# Patient Record
Sex: MALE | Employment: FULL TIME | URBAN - METROPOLITAN AREA
[De-identification: names, ages, dates, MRNs, and addresses within clinical notes are randomized per-mention and may not be internally consistent; named-entity substitution may affect disease eponyms.]

---

## 2018-09-24 ENCOUNTER — HOSPITAL ENCOUNTER (OUTPATIENT)
Age: 28
Discharge: HOME | End: 2018-09-24
Attending: FAMILY MEDICINE
Payer: COMMERCIAL

## 2018-09-24 VITALS
TEMPERATURE: 98 F | SYSTOLIC BLOOD PRESSURE: 126 MMHG | OXYGEN SATURATION: 100 % | RESPIRATION RATE: 18 BRPM | DIASTOLIC BLOOD PRESSURE: 75 MMHG | HEART RATE: 70 BPM

## 2018-09-24 DIAGNOSIS — S61.012A LACERATION OF LEFT THUMB WITHOUT FOREIGN BODY WITHOUT DAMAGE TO NAIL, INITIAL ENCOUNTER: Primary | ICD-10-CM

## 2018-09-24 PROCEDURE — 12001 RPR S/N/AX/GEN/TRNK 2.5CM/<: CPT

## 2018-09-24 PROCEDURE — 99203 OFFICE O/P NEW LOW 30 MIN: CPT

## 2018-09-24 PROCEDURE — 99202 OFFICE O/P NEW SF 15 MIN: CPT

## 2018-09-24 NOTE — ED INITIAL ASSESSMENT (HPI)
Pt was taking a shower this am about 40 minutes ago, and the glass door shattered as he was holding the handle opening the door. He has a 1 cm jagged cut to the left hand.   He believes he has had a Tetanus in the past year, and will check his records to v

## 2018-09-24 NOTE — ED PROVIDER NOTES
Patient Seen in: [facility]    History   Patient presents with:  Laceration Abrasion (integumentary)    Stated Complaint: laceration x today     HPI    Right-hand-dominant male with a laceration to the left thumb this morning. Yes  TendonFunctionIntact: Yes  DressingType: Band-Aid  Td: Presumed UTD  PT Tolerated: Without complaint      MDM   Visual laceration of the left thumb. Suture, glue, and Steri-Strips were discussed. Patient opted for clue. Tetanus is presumed today.

## 2018-09-26 PROBLEM — G56.22 LESION OF LEFT ULNAR NERVE: Status: ACTIVE | Noted: 2018-09-26

## 2019-04-29 ENCOUNTER — HOSPITAL ENCOUNTER (OUTPATIENT)
Age: 29
Discharge: HOME | End: 2019-04-29
Attending: FAMILY MEDICINE
Payer: COMMERCIAL

## 2019-04-29 VITALS
DIASTOLIC BLOOD PRESSURE: 84 MMHG | HEART RATE: 65 BPM | OXYGEN SATURATION: 99 % | SYSTOLIC BLOOD PRESSURE: 137 MMHG | RESPIRATION RATE: 16 BRPM | TEMPERATURE: 98 F

## 2019-04-29 DIAGNOSIS — H61.23 BILATERAL IMPACTED CERUMEN: Primary | ICD-10-CM

## 2019-04-29 DIAGNOSIS — H60.503 ACUTE OTITIS EXTERNA OF BOTH EARS, UNSPECIFIED TYPE: ICD-10-CM

## 2019-04-29 PROCEDURE — 99214 OFFICE O/P EST MOD 30 MIN: CPT

## 2019-04-29 PROCEDURE — 69209 REMOVE IMPACTED EAR WAX UNI: CPT

## 2019-04-29 PROCEDURE — 99213 OFFICE O/P EST LOW 20 MIN: CPT

## 2019-04-29 RX ORDER — NEOMYCIN SULFATE, POLYMYXIN B SULFATE AND HYDROCORTISONE 10; 3.5; 1 MG/ML; MG/ML; [USP'U]/ML
4 SUSPENSION/ DROPS AURICULAR (OTIC) 3 TIMES DAILY
Qty: 10 ML | Refills: 0 | Status: SHIPPED | OUTPATIENT
Start: 2019-04-29 | End: 2019-05-02

## 2019-04-30 NOTE — ED INITIAL ASSESSMENT (HPI)
Complains of hearing difficulty on the left ear for the last three days. States had placed an OTC medication for ear wax.

## 2019-04-30 NOTE — ED PROVIDER NOTES
Patient Seen in: [facility]    History   Patient presents with:  Ear Problem Pain (neurosensory)    Stated Complaint: ear clogged     HPI    This 24-year-old male presents to the office with complaint of decreased hearing out of was placed in both ears and then bilateral ears were irrigated by nursing staff. Repeat ear exam after the ear flush shows bilateral TMs to be normal.  Bilateral EACs are mildly red and irritated. No drainage is noted.      Symptomatic treatment for earwa

## 2019-11-27 ENCOUNTER — OFFICE VISIT (OUTPATIENT)
Dept: FAMILY MEDICINE CLINIC | Facility: CLINIC | Age: 29
End: 2019-11-27
Payer: COMMERCIAL

## 2019-11-27 VITALS
RESPIRATION RATE: 20 BRPM | WEIGHT: 196 LBS | HEART RATE: 68 BPM | OXYGEN SATURATION: 98 % | BODY MASS INDEX: 29.7 KG/M2 | SYSTOLIC BLOOD PRESSURE: 118 MMHG | DIASTOLIC BLOOD PRESSURE: 82 MMHG | TEMPERATURE: 98 F | HEIGHT: 68 IN

## 2019-11-27 DIAGNOSIS — J01.00 ACUTE MAXILLARY SINUSITIS, RECURRENCE NOT SPECIFIED: Primary | ICD-10-CM

## 2019-11-27 DIAGNOSIS — J01.10 ACUTE FRONTAL SINUSITIS, RECURRENCE NOT SPECIFIED: ICD-10-CM

## 2019-11-27 PROCEDURE — 99203 OFFICE O/P NEW LOW 30 MIN: CPT | Performed by: NURSE PRACTITIONER

## 2019-11-27 RX ORDER — AMOXICILLIN 875 MG/1
875 TABLET, COATED ORAL 2 TIMES DAILY
Qty: 20 TABLET | Refills: 0 | Status: SHIPPED | OUTPATIENT
Start: 2019-11-27 | End: 2019-12-07

## 2019-11-27 RX ORDER — FLUTICASONE PROPIONATE 50 MCG
1 SPRAY, SUSPENSION (ML) NASAL 2 TIMES DAILY
Qty: 1 BOTTLE | Refills: 1 | Status: SHIPPED | OUTPATIENT
Start: 2019-11-27 | End: 2019-12-27

## 2019-11-27 NOTE — PATIENT INSTRUCTIONS
·  PLAN: Amoxicillin, take as directed. Finish all the medication even if you feel better. · Probiotics or yogurt daily during antibiotic use will help decrease stomach upset and restore good bacteria to the gut.   Please start Flonase 1 spray each nostri

## 2019-11-27 NOTE — PROGRESS NOTES
HPI:   Lexus Nation is a 34year old male who presents with ill symptoms for  1  months. Patient reports persistent congestion, tactile fever, sinus pressure. Has tried Mucinex, Motrin and occasional sinus medications with mild temporary relief.  Wife a by Each Nare route 2 (two) times daily. Acute frontal sinusitis, recurrence not specified  -     amoxicillin 875 MG Oral Tab;  Take 1 tablet (875 mg total) by mouth 2 (two) times daily for 10 days.  -     Fluticasone Propionate 50 MCG/ACT Nasal Suspensio

## (undated) NOTE — LETTER
Date & Time: 9/24/2018, 9:26 AM  Patient: Tank Perez  Encounter Provider(s):    Tyson Otero MD       To Whom It May Concern:    Bogdan Killian was seen and treated in our department on 9/24/2018.  He should not return to work until September 25,